# Patient Record
Sex: FEMALE | Race: WHITE | NOT HISPANIC OR LATINO | Employment: OTHER | URBAN - METROPOLITAN AREA
[De-identification: names, ages, dates, MRNs, and addresses within clinical notes are randomized per-mention and may not be internally consistent; named-entity substitution may affect disease eponyms.]

---

## 2024-05-14 DIAGNOSIS — Z79.01 CHRONIC ANTICOAGULATION: ICD-10-CM

## 2024-05-16 ENCOUNTER — OFFICE VISIT (OUTPATIENT)
Dept: MEDICAL GROUP | Facility: MEDICAL CENTER | Age: 84
End: 2024-05-16
Payer: COMMERCIAL

## 2024-05-16 VITALS
SYSTOLIC BLOOD PRESSURE: 118 MMHG | HEIGHT: 64 IN | BODY MASS INDEX: 40.97 KG/M2 | DIASTOLIC BLOOD PRESSURE: 50 MMHG | HEART RATE: 69 BPM | WEIGHT: 240 LBS | OXYGEN SATURATION: 94 % | TEMPERATURE: 98.8 F

## 2024-05-16 DIAGNOSIS — I48.0 PAROXYSMAL ATRIAL FIBRILLATION (HCC): ICD-10-CM

## 2024-05-16 DIAGNOSIS — J45.909 UNCOMPLICATED ASTHMA, UNSPECIFIED ASTHMA SEVERITY, UNSPECIFIED WHETHER PERSISTENT: ICD-10-CM

## 2024-05-16 DIAGNOSIS — E66.01 MORBID OBESITY WITH BMI OF 40.0-44.9, ADULT (HCC): ICD-10-CM

## 2024-05-16 DIAGNOSIS — G47.33 OSA ON CPAP: Chronic | ICD-10-CM

## 2024-05-16 DIAGNOSIS — R07.89 CHEST DISCOMFORT: ICD-10-CM

## 2024-05-16 DIAGNOSIS — M25.531 PAIN OF BOTH WRIST JOINTS: ICD-10-CM

## 2024-05-16 DIAGNOSIS — Z99.81 ON HOME OXYGEN THERAPY: ICD-10-CM

## 2024-05-16 DIAGNOSIS — E53.8 B12 DEFICIENCY: ICD-10-CM

## 2024-05-16 DIAGNOSIS — E66.2 OBESITY HYPOVENTILATION SYNDROME (HCC): ICD-10-CM

## 2024-05-16 DIAGNOSIS — Z95.0 CARDIAC PACEMAKER IN SITU: ICD-10-CM

## 2024-05-16 DIAGNOSIS — E11.8 TYPE 2 DIABETES MELLITUS WITH UNSPECIFIED COMPLICATIONS (HCC): ICD-10-CM

## 2024-05-16 DIAGNOSIS — R00.1 SYMPTOMATIC BRADYCARDIA: ICD-10-CM

## 2024-05-16 DIAGNOSIS — J96.11 CHRONIC RESPIRATORY FAILURE WITH HYPOXIA (HCC): Chronic | ICD-10-CM

## 2024-05-16 DIAGNOSIS — G47.30 SLEEP APNEA, UNSPECIFIED TYPE: ICD-10-CM

## 2024-05-16 DIAGNOSIS — Z95.2 S/P TAVR (TRANSCATHETER AORTIC VALVE REPLACEMENT): ICD-10-CM

## 2024-05-16 DIAGNOSIS — I10 ESSENTIAL (PRIMARY) HYPERTENSION: ICD-10-CM

## 2024-05-16 DIAGNOSIS — M25.532 PAIN OF BOTH WRIST JOINTS: ICD-10-CM

## 2024-05-16 DIAGNOSIS — Z95.0 PACEMAKER: ICD-10-CM

## 2024-05-16 DIAGNOSIS — R30.0 DYSURIA: ICD-10-CM

## 2024-05-16 DIAGNOSIS — R60.0 LOWER LEG EDEMA: ICD-10-CM

## 2024-05-16 DIAGNOSIS — D64.9 NORMOCYTIC ANEMIA: ICD-10-CM

## 2024-05-16 DIAGNOSIS — E03.4 HYPOTHYROIDISM DUE TO ACQUIRED ATROPHY OF THYROID: ICD-10-CM

## 2024-05-16 PROBLEM — I35.0 AORTIC STENOSIS: Status: RESOLVED | Noted: 2018-09-19 | Resolved: 2024-05-16

## 2024-05-16 PROBLEM — I35.0 CRITICAL AORTIC VALVE STENOSIS: Status: RESOLVED | Noted: 2018-09-20 | Resolved: 2024-05-16

## 2024-05-16 PROBLEM — I16.0 HYPERTENSIVE URGENCY: Status: RESOLVED | Noted: 2024-01-09 | Resolved: 2024-05-16

## 2024-05-16 PROBLEM — H91.93 DECREASED HEARING OF BOTH EARS: Status: RESOLVED | Noted: 2019-10-31 | Resolved: 2024-05-16
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
## 2024-05-16 PROBLEM — J32.9 FREQUENT SINUS INFECTIONS: Status: RESOLVED | Noted: 2020-07-31 | Resolved: 2024-05-16

## 2024-05-16 PROCEDURE — 3078F DIAST BP <80 MM HG: CPT | Performed by: STUDENT IN AN ORGANIZED HEALTH CARE EDUCATION/TRAINING PROGRAM

## 2024-05-16 PROCEDURE — 3074F SYST BP LT 130 MM HG: CPT | Performed by: STUDENT IN AN ORGANIZED HEALTH CARE EDUCATION/TRAINING PROGRAM

## 2024-05-16 PROCEDURE — 99214 OFFICE O/P EST MOD 30 MIN: CPT | Performed by: STUDENT IN AN ORGANIZED HEALTH CARE EDUCATION/TRAINING PROGRAM

## 2024-05-16 RX ORDER — EMPAGLIFLOZIN 25 MG/1
25 TABLET, FILM COATED ORAL DAILY
COMMUNITY
Start: 2024-03-26

## 2024-05-16 RX ORDER — HYDRALAZINE HYDROCHLORIDE 50 MG/1
50 TABLET, FILM COATED ORAL 3 TIMES DAILY
Qty: 300 TABLET | Refills: 3 | Status: SHIPPED | OUTPATIENT
Start: 2024-05-16

## 2024-05-16 RX ORDER — LEVOTHYROXINE SODIUM 0.12 MG/1
125 TABLET ORAL
Qty: 90 TABLET | Refills: 2 | Status: SHIPPED | OUTPATIENT
Start: 2024-05-16

## 2024-05-16 RX ORDER — BLOOD SUGAR DIAGNOSTIC
STRIP MISCELLANEOUS
COMMUNITY
Start: 2024-04-04

## 2024-05-16 RX ORDER — LISINOPRIL 40 MG/1
40 TABLET ORAL DAILY
Qty: 90 TABLET | Refills: 3 | Status: SHIPPED | OUTPATIENT
Start: 2024-05-16

## 2024-05-16 RX ORDER — FUROSEMIDE 20 MG/1
20 TABLET ORAL
Qty: 30 TABLET | Refills: 0 | Status: SHIPPED | OUTPATIENT
Start: 2024-05-16

## 2024-05-16 RX ORDER — WARFARIN SODIUM 7.5 MG/1
7.5 TABLET ORAL DAILY
Qty: 30 TABLET | Refills: 3 | Status: SHIPPED | OUTPATIENT
Start: 2024-05-16

## 2024-05-16 RX ORDER — LIOTHYRONINE SODIUM 5 UG/1
5 TABLET ORAL DAILY
COMMUNITY
Start: 2024-03-26

## 2024-05-16 ASSESSMENT — ENCOUNTER SYMPTOMS
FEVER: 0
PALPITATIONS: 0
VOMITING: 0
SHORTNESS OF BREATH: 0
CHILLS: 0
HEADACHES: 0
NAUSEA: 0
WHEEZING: 0
WEIGHT LOSS: 0
DIZZINESS: 0

## 2024-05-16 ASSESSMENT — FIBROSIS 4 INDEX: FIB4 SCORE: 1.94

## 2024-05-16 NOTE — PROGRESS NOTES
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   Discussed with patient she needs more frequent visit due to medical complexity   Last seen 1 year ago.   Urinary urgency/ frequency  Letter stating family has been acting as care giver  Referral to cardiology  Referral to pulmonology/ sleep  Medication refill  DME oxygen  arthralgia    #Urgency/ frequency  - 3-5 day  - mild dysuria while urinating, denies fever, but reports chills    # anxiety/ Panic  - she report she has been experiencing more symptoms of night time awakening as she has not been using her cpap since it is broken.   - she has recently seen at [facility] and given dose of medrol dose maria alejandra which may also contribute to her symptoms.     # episode of night time awakening  # chest discomfort- she reports waking up one night with right sided / sternal chest pain that improved with albuterol. She endorse she has been waking up at night due to cpap machines not working. She report this pain also is reproducible when palpating her sternum.     Health Maintenance:     ROS:  Review of Systems   Constitutional:  Negative for chills, fever and weight loss.   HENT:  Negative for hearing loss.    Respiratory:  Negative for shortness of breath and wheezing.    Cardiovascular:  Negative for chest pain and palpitations.   Gastrointestinal:   "Negative for nausea and vomiting.   Genitourinary:  Negative for frequency and urgency.   Skin:  Negative for rash.   Neurological:  Negative for dizziness and headaches.       Objective:     Exam:  /50 (BP Location: Left arm)   Pulse 69   Temp 37.1 °C (98.8 °F)   Ht 1.626 m (5' 4\")   Wt 109 kg (240 lb)   LMP 09/19/1981   SpO2 94% Comment: 4L O2  BMI 41.20 kg/m²  Body mass index is 41.2 kg/m².    Physical Exam  Constitutional:       Appearance: Normal appearance.   Pulmonary:      Comments: Speaking in complete sentences without signs of acute respiratory distress.   Musculoskeletal:      Cervical back: Normal range of motion and neck supple.      Comments: TTP sternum   Neurological:      General: No focal deficit present.      Mental Status: She is alert and oriented to person, place, and time. Mental status is at baseline.           Labs:     Assessment & Plan:     83 y.o. female with the following -   1. Symptomatic bradycardia  S/p a1cd  Following with cards    2. Hypothyroidism due to acquired atrophy of thyroid  Chronic stable  Last TSH in 1/2024 in normal range  - liothyronine (CYTOMEL) 5 MCG Tab; Take 5 mcg by mouth every day.  - levothyroxine (SYNTHROID) 125 MCG Tab; Take 1 Tablet by mouth every morning on an empty stomach. Brand Name  Dispense: 90 Tablet; Refill: 2  - CBC WITHOUT DIFFERENTIAL; Future  - Comp Metabolic Panel; Future    3. Lower leg edema  Chronic stable  - furosemide (LASIX) 20 MG Tab; Take 1 Tablet by mouth 1 time a day as needed (lower extremity edema).  Dispense: 30 Tablet; Refill: 0  - CBC WITHOUT DIFFERENTIAL; Future  - Comp Metabolic Panel; Future    4. Paroxysmal atrial fibrillation (HCC)  Chronic stable follows with AC  - warfarin (COUMADIN) 7.5 MG Tab; Take 1 Tablet by mouth every day at 6 PM.  Dispense: 30 Tablet; Refill: 3  - CBC WITHOUT DIFFERENTIAL; Future  - Comp Metabolic Panel; Future  - REFERRAL TO CARDIOLOGY    5. S/P TAVR (transcatheter aortic valve " replacement)  Chronic stable follows with A/C  - warfarin (COUMADIN) 7.5 MG Tab; Take 1 Tablet by mouth every day at 6 PM.  Dispense: 30 Tablet; Refill: 3  - REFERRAL TO CARDIOLOGY    6. Pacemaker  Request referral to Dr. Heart  - REFERRAL TO CARDIOLOGY    7. Cardiac pacemaker in situ  Followign with EP  - warfarin (COUMADIN) 7.5 MG Tab; Take 1 Tablet by mouth every day at 6 PM.  Dispense: 30 Tablet; Refill: 3  - REFERRAL TO CARDIOLOGY    8. On home oxygen therapy    - DME O2 New Set Up    9. TYLER on CPAP    - DME O2 New Set Up    10. Chronic respiratory failure with hypoxia (HCC)    - DME O2 New Set Up  - Referral to Pulmonary and Sleep Medicine    11. Sleep apnea, unspecified type    - Referral to Pulmonary and Sleep Medicine    12. Dysuria  Acute stable x 3 week  In setting of jardiance  Will obtain lab UA  Recommend patient schedule visit to discuss further regarding possible incontinence issue  - URINALYSIS,CULTURE IF INDICATED; Future    13. Normocytic anemia  Noted on last lab  - IRON/TOTAL IRON BIND; Future  - VITAMIN B12; Future    14. B12 deficiency  Report prior history of     15. Morbid obesity with BMI of 40.0-44.9, adult (HCC)  Chronic stable  - DME O2 New Set Up  - Patient identified as having weight management issue.  Appropriate orders and counseling given.    16. Essential (primary) hypertension  Chronic stable   Medication refilled  Bp well controlled   - hydrALAZINE (APRESOLINE) 50 MG Tab; Take 1 Tablet by mouth in the morning, at noon, and at bedtime.  Dispense: 300 Tablet; Refill: 3  - lisinopril (PRINIVIL) 40 MG tablet; Take 1 Tablet by mouth every day.  Dispense: 90 Tablet; Refill: 3    17. Type 2 diabetes mellitus with unspecified complications (HCC)  Chronic stable  Medication refilled   - JARDIANCE 25 MG Tab; Take 25 mg by mouth every day.  - ACCU-CHEK GUIDE strip; USE ONCE DAILY TO TEST BLOOD SUGAR AND AS NEEDED FOR HIGH OR LOW SUGAR    18. Pain of both wrist joints  Chronic  stable  Recommend acetaminophen and voltaren gel  Ibuprofen CI in setting of warfarin     19 asthma  20obesity hypoventilation syndrome  - dme o2  - refer to pulm/ slee    21 chest discomfort  1 episode that occurred during the night, no current chest pain. Reports symptoms resolved with use of albuterol. She reports similar pain is presented with palpation of her sternum/ sternum head. Discussed with patient based on on description I do not think it is ACS however, she is at high risk for ACS given age and multiple comorbidities and she should head to ED if needed.   Return in about 3 months (around 8/16/2024) for Lab review, Med check.    Please note that this dictation was created using voice recognition software. I have made every reasonable attempt to correct obvious errors, but I expect that there are errors of grammar and possibly content that I did not discover before finalizing the note.

## 2024-05-16 NOTE — LETTER
[facility] [address] [facility] [address]     May 16, 2024    Patient: Noemi Moran   YOB: 1940   Date of Visit: 5/16/2024       To Whom It May Concern:    Noemi Moran was seen and treated in our department on 5/16/2024. She has significant medical comorbidity and Bimal Moran and Domenica Moran has been acting as her primary care giver 1/2024-5/16/2024.     Sincerely,     Felipe Masterson M.D.

## 2024-06-03 PROBLEM — M18.12 PRIMARY OSTEOARTHRITIS OF FIRST CARPOMETACARPAL JOINT OF LEFT HAND: Status: ACTIVE | Noted: 2024-06-03

## 2024-06-03 PROBLEM — M18.11 PRIMARY OSTEOARTHRITIS OF FIRST CARPOMETACARPAL JOINT OF RIGHT HAND: Status: ACTIVE | Noted: 2024-06-03

## 2024-06-03 PROBLEM — M25.539 PAIN OF ULNAR SIDE OF WRIST: Status: ACTIVE | Noted: 2024-06-03

## 2024-06-04 DIAGNOSIS — Z79.01 CHRONIC ANTICOAGULATION: ICD-10-CM

## 2024-06-05 ENCOUNTER — HOSPITAL ENCOUNTER (OUTPATIENT)
Dept: LAB | Facility: MEDICAL CENTER | Age: 84
End: 2024-06-05
Attending: STUDENT IN AN ORGANIZED HEALTH CARE EDUCATION/TRAINING PROGRAM
Payer: MEDICARE

## 2024-06-05 ENCOUNTER — TELEPHONE (OUTPATIENT)
Dept: MEDICAL GROUP | Facility: MEDICAL CENTER | Age: 84
End: 2024-06-05
Payer: MEDICARE

## 2024-06-05 DIAGNOSIS — E03.4 HYPOTHYROIDISM DUE TO ACQUIRED ATROPHY OF THYROID: ICD-10-CM

## 2024-06-05 DIAGNOSIS — R30.0 DYSURIA: ICD-10-CM

## 2024-06-05 DIAGNOSIS — I48.0 PAROXYSMAL ATRIAL FIBRILLATION (HCC): ICD-10-CM

## 2024-06-05 DIAGNOSIS — R60.0 LOWER LEG EDEMA: ICD-10-CM

## 2024-06-05 DIAGNOSIS — D64.9 NORMOCYTIC ANEMIA: ICD-10-CM

## 2024-06-05 LAB
ALBUMIN SERPL BCP-MCNC: 3.9 G/DL (ref 3.2–4.9)
ALBUMIN/GLOB SERPL: 1.4 G/DL
ALP SERPL-CCNC: 58 U/L (ref 30–99)
ALT SERPL-CCNC: 12 U/L (ref 2–50)
ANION GAP SERPL CALC-SCNC: 11 MMOL/L (ref 7–16)
AST SERPL-CCNC: 14 U/L (ref 12–45)
BILIRUB SERPL-MCNC: 0.2 MG/DL (ref 0.1–1.5)
BUN SERPL-MCNC: 19 MG/DL (ref 8–22)
CALCIUM ALBUM COR SERPL-MCNC: 9 MG/DL (ref 8.5–10.5)
CALCIUM SERPL-MCNC: 8.9 MG/DL (ref 8.5–10.5)
CHLORIDE SERPL-SCNC: 106 MMOL/L (ref 96–112)
CO2 SERPL-SCNC: 24 MMOL/L (ref 20–33)
CREAT SERPL-MCNC: 0.7 MG/DL (ref 0.5–1.4)
ERYTHROCYTE [DISTWIDTH] IN BLOOD BY AUTOMATED COUNT: 41.5 FL (ref 35.9–50)
GFR SERPLBLD CREATININE-BSD FMLA CKD-EPI: 85 ML/MIN/1.73 M 2
GLOBULIN SER CALC-MCNC: 2.7 G/DL (ref 1.9–3.5)
GLUCOSE SERPL-MCNC: 110 MG/DL (ref 65–99)
HCT VFR BLD AUTO: 36.5 % (ref 37–47)
HGB BLD-MCNC: 11.6 G/DL (ref 12–16)
IRON SATN MFR SERPL: 15 % (ref 15–55)
IRON SERPL-MCNC: 39 UG/DL (ref 40–170)
MCH RBC QN AUTO: 27.9 PG (ref 27–33)
MCHC RBC AUTO-ENTMCNC: 31.8 G/DL (ref 32.2–35.5)
MCV RBC AUTO: 87.7 FL (ref 81.4–97.8)
PLATELET # BLD AUTO: 194 K/UL (ref 164–446)
PMV BLD AUTO: 13 FL (ref 9–12.9)
POTASSIUM SERPL-SCNC: 4.1 MMOL/L (ref 3.6–5.5)
PROT SERPL-MCNC: 6.6 G/DL (ref 6–8.2)
RBC # BLD AUTO: 4.16 M/UL (ref 4.2–5.4)
SODIUM SERPL-SCNC: 141 MMOL/L (ref 135–145)
TIBC SERPL-MCNC: 268 UG/DL (ref 250–450)
UIBC SERPL-MCNC: 229 UG/DL (ref 110–370)
WBC # BLD AUTO: 5.9 K/UL (ref 4.8–10.8)

## 2024-06-05 PROCEDURE — 82607 VITAMIN B-12: CPT

## 2024-06-05 PROCEDURE — 80053 COMPREHEN METABOLIC PANEL: CPT

## 2024-06-05 PROCEDURE — 83550 IRON BINDING TEST: CPT

## 2024-06-05 PROCEDURE — 36415 COLL VENOUS BLD VENIPUNCTURE: CPT

## 2024-06-05 PROCEDURE — 83540 ASSAY OF IRON: CPT

## 2024-06-05 PROCEDURE — 85027 COMPLETE CBC AUTOMATED: CPT

## 2024-06-06 DIAGNOSIS — D50.9 IRON DEFICIENCY ANEMIA, UNSPECIFIED IRON DEFICIENCY ANEMIA TYPE: ICD-10-CM

## 2024-06-06 LAB — VIT B12 SERPL-MCNC: 1404 PG/ML (ref 211–911)

## 2024-06-06 RX ORDER — FERROUS SULFATE 325(65) MG
325 TABLET ORAL
Qty: 36 TABLET | Refills: 1 | Status: SHIPPED | OUTPATIENT
Start: 2024-06-07

## 2024-07-01 DIAGNOSIS — R60.0 LOWER LEG EDEMA: ICD-10-CM

## 2024-07-03 RX ORDER — FUROSEMIDE 20 MG/1
20 TABLET ORAL
Qty: 30 TABLET | Refills: 0 | Status: SHIPPED | OUTPATIENT
Start: 2024-07-03

## 2024-07-11 ENCOUNTER — NON-PROVIDER VISIT (OUTPATIENT)
Dept: CARDIOLOGY | Facility: MEDICAL CENTER | Age: 84
End: 2024-07-11
Payer: MEDICARE

## 2024-07-11 PROCEDURE — 93294 REM INTERROG EVL PM/LDLS PM: CPT | Performed by: INTERNAL MEDICINE

## 2024-08-04 DIAGNOSIS — R60.0 LOWER LEG EDEMA: ICD-10-CM

## 2024-08-05 RX ORDER — FUROSEMIDE 20 MG/1
TABLET ORAL
Qty: 90 TABLET | Refills: 0 | Status: SHIPPED | OUTPATIENT
Start: 2024-08-05

## 2024-08-13 ENCOUNTER — TELEPHONE (OUTPATIENT)
Dept: CARDIOLOGY | Facility: MEDICAL CENTER | Age: 84
End: 2024-08-13
Payer: MEDICARE

## 2024-08-15 ENCOUNTER — OFFICE VISIT (OUTPATIENT)
Dept: MEDICAL GROUP | Facility: MEDICAL CENTER | Age: 84
End: 2024-08-15
Payer: MEDICARE

## 2024-08-15 VITALS
HEIGHT: 64 IN | OXYGEN SATURATION: 95 % | SYSTOLIC BLOOD PRESSURE: 140 MMHG | WEIGHT: 237.88 LBS | DIASTOLIC BLOOD PRESSURE: 76 MMHG | BODY MASS INDEX: 40.61 KG/M2 | HEART RATE: 85 BPM | TEMPERATURE: 97.9 F

## 2024-08-15 DIAGNOSIS — E11.8 TYPE 2 DIABETES MELLITUS WITH UNSPECIFIED COMPLICATIONS (HCC): ICD-10-CM

## 2024-08-15 DIAGNOSIS — D50.9 IRON DEFICIENCY ANEMIA, UNSPECIFIED IRON DEFICIENCY ANEMIA TYPE: ICD-10-CM

## 2024-08-15 DIAGNOSIS — R39.15 URINARY URGENCY: ICD-10-CM

## 2024-08-15 PROBLEM — E66.01 MORBID OBESITY WITH BMI OF 40.0-44.9, ADULT (HCC): Status: RESOLVED | Noted: 2024-05-16 | Resolved: 2024-08-15

## 2024-08-15 PROCEDURE — 3078F DIAST BP <80 MM HG: CPT | Performed by: STUDENT IN AN ORGANIZED HEALTH CARE EDUCATION/TRAINING PROGRAM

## 2024-08-15 PROCEDURE — 3077F SYST BP >= 140 MM HG: CPT | Performed by: STUDENT IN AN ORGANIZED HEALTH CARE EDUCATION/TRAINING PROGRAM

## 2024-08-15 PROCEDURE — 99214 OFFICE O/P EST MOD 30 MIN: CPT | Performed by: STUDENT IN AN ORGANIZED HEALTH CARE EDUCATION/TRAINING PROGRAM

## 2024-08-15 RX ORDER — FERROUS SULFATE 325(65) MG
325 TABLET ORAL
Qty: 36 TABLET | Refills: 1 | Status: SHIPPED | OUTPATIENT
Start: 2024-08-16

## 2024-08-15 ASSESSMENT — ENCOUNTER SYMPTOMS
NAUSEA: 0
PALPITATIONS: 0
FEVER: 0
HEADACHES: 0
DIZZINESS: 0
WEIGHT LOSS: 0
WHEEZING: 0
SHORTNESS OF BREATH: 0
CHILLS: 0
VOMITING: 0

## 2024-08-15 ASSESSMENT — FIBROSIS 4 INDEX: FIB4 SCORE: 1.73

## 2024-08-15 NOTE — PROGRESS NOTES
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    Chronic anemia-CBC from 7 months ago showed anemia CBC from 2 months ago does show persistent anemia with slightly elevated MCHC. Iron panel showed iron saturation of 15 we will start patient on iron supplement.  Patient declined stool workup/colonoscopy referral at this time we did discuss possibility about bleeding in the colon/colon cancer.     Health Maintenance:     ROS:  Review of Systems   Constitutional:  Negative for chills, fever and weight loss.   HENT:  Negative for hearing loss.    Respiratory:  Negative for shortness of breath and wheezing.    Cardiovascular:  Negative for chest pain and palpitations.   Gastrointestinal:  Negative for nausea and vomiting.   Genitourinary:  Negative for frequency and urgency.   Skin:  Negative for rash.   Neurological:  Negative for dizziness and headaches.       Objective:     Exam:  BP (!) 140/76 (BP Location: Left arm)   Pulse 85   Temp 36.6 °C (97.9 °F)   Ht 1.626 m (5' 4\")   Wt 108 kg (237 lb 14 oz)   LMP 09/19/1981   SpO2 95% Comment: 3-4 L o2  BMI 40.83 kg/m²  Body mass " index is 40.83 kg/m².    Physical Exam  Constitutional:       Appearance: Normal appearance.   Cardiovascular:      Heart sounds: No murmur heard.  Pulmonary:      Effort: Pulmonary effort is normal.      Breath sounds: Normal breath sounds. No wheezing.   Musculoskeletal:      Cervical back: Normal range of motion and neck supple.   Neurological:      Mental Status: She is alert.           Labs:     Assessment & Plan:     83 y.o. female with the following -     1. Iron deficiency anemia, unspecified iron deficiency anemia type  Chronic, stable  Anemia with iron saturation of 15 patient's not interested in colonoscopy understanding the risks of colon cancer.  She had a procedure in January.  At this time we will repeat lab and replace iron as blood work does not improve may consider referral for colonoscopy however likely will need cardiology rule out  - ferrous sulfate 325 (65 Fe) MG tablet; Take 1 Tablet by mouth every Monday, Wednesday, and Friday.  Dispense: 36 Tablet; Refill: 1  - CBC WITHOUT DIFFERENTIAL; Future  - IRON/TOTAL IRON BIND; Future  - FERRITIN; Future  - Comp Metabolic Panel; Future    2. Type 2 diabetes mellitus with unspecified complications (HCC)  Chronic, stable  Patient is following with Dr. Whitaker however report has not followed with Dr. Pelletier for a while she is on metformin 1 g twice a day and Jardiance 25 mg was sent during last visit he reports he is taking both medications tolerating well  - CBC WITHOUT DIFFERENTIAL; Future  - IRON/TOTAL IRON BIND; Future  - FERRITIN; Future  - Comp Metabolic Panel; Future  - HEMOGLOBIN A1C; Future    3. Urinary urgency  This is a chronic and stable condition patient report history of urinary urgency heat she will schedule visit to discuss this        Return in about 3 months (around 11/15/2024) for Lab review, Med check.    Please note that this dictation was created using voice recognition software. I have made every reasonable attempt to  correct obvious errors, but I expect that there are errors of grammar and possibly content that I did not discover before finalizing the note.

## 2024-08-15 NOTE — LETTER
[place] [facility] [address]  [address] [facility] [address]     August 15, 2024    Patient: Noemi Moran   YOB: 1940   Date of Visit: 8/15/2024       To Whom It May Concern:    Noemi Moran was seen and treated in our department on 8/15/2024. Patient is oxygen dependent and uses a oxygen concentrator, so she would require uninterrupted electricity if possible.      Sincerely,     Felipe Masterson M.D.

## 2024-08-15 NOTE — LETTER
[facility] [address]  [address] [facility] [facility] [address]     August 15, 2024    Patient: Noemi Moran   YOB: 1940   Date of Visit: 8/15/2024       To Whom It May Concern:    Noemi Moran was seen and treated in our department on 8/15/2024. She has multiple diseases and she is unable to push her garbage to the street. Please accommodate if possible.       Sincerely,     Felipe Masterson M.D.

## 2024-08-21 ENCOUNTER — HOSPITAL ENCOUNTER (OUTPATIENT)
Dept: LAB | Facility: MEDICAL CENTER | Age: 84
End: 2024-08-21
Attending: STUDENT IN AN ORGANIZED HEALTH CARE EDUCATION/TRAINING PROGRAM
Payer: MEDICARE

## 2024-08-21 DIAGNOSIS — E11.8 TYPE 2 DIABETES MELLITUS WITH UNSPECIFIED COMPLICATIONS (HCC): ICD-10-CM

## 2024-08-21 DIAGNOSIS — D50.9 IRON DEFICIENCY ANEMIA, UNSPECIFIED IRON DEFICIENCY ANEMIA TYPE: ICD-10-CM

## 2024-08-21 LAB
ALBUMIN SERPL BCP-MCNC: 4.1 G/DL (ref 3.2–4.9)
ALBUMIN/GLOB SERPL: 1.5 G/DL
ALP SERPL-CCNC: 61 U/L (ref 30–99)
ALT SERPL-CCNC: 14 U/L (ref 2–50)
ANION GAP SERPL CALC-SCNC: 11 MMOL/L (ref 7–16)
AST SERPL-CCNC: 15 U/L (ref 12–45)
BILIRUB SERPL-MCNC: 0.5 MG/DL (ref 0.1–1.5)
BUN SERPL-MCNC: 26 MG/DL (ref 8–22)
CALCIUM ALBUM COR SERPL-MCNC: 9.2 MG/DL (ref 8.5–10.5)
CALCIUM SERPL-MCNC: 9.3 MG/DL (ref 8.5–10.5)
CHLORIDE SERPL-SCNC: 105 MMOL/L (ref 96–112)
CO2 SERPL-SCNC: 26 MMOL/L (ref 20–33)
CREAT SERPL-MCNC: 0.94 MG/DL (ref 0.5–1.4)
ERYTHROCYTE [DISTWIDTH] IN BLOOD BY AUTOMATED COUNT: 44.5 FL (ref 35.9–50)
EST. AVERAGE GLUCOSE BLD GHB EST-MCNC: 128 MG/DL
FERRITIN SERPL-MCNC: 96.5 NG/ML (ref 10–291)
GFR SERPLBLD CREATININE-BSD FMLA CKD-EPI: 60 ML/MIN/1.73 M 2
GLOBULIN SER CALC-MCNC: 2.8 G/DL (ref 1.9–3.5)
GLUCOSE SERPL-MCNC: 109 MG/DL (ref 65–99)
HBA1C MFR BLD: 6.1 % (ref 4–5.6)
HCT VFR BLD AUTO: 38.9 % (ref 37–47)
HGB BLD-MCNC: 12.5 G/DL (ref 12–16)
IRON SATN MFR SERPL: 39 % (ref 15–55)
IRON SERPL-MCNC: 100 UG/DL (ref 40–170)
MCH RBC QN AUTO: 28.6 PG (ref 27–33)
MCHC RBC AUTO-ENTMCNC: 32.1 G/DL (ref 32.2–35.5)
MCV RBC AUTO: 89 FL (ref 81.4–97.8)
PLATELET # BLD AUTO: 182 K/UL (ref 164–446)
PMV BLD AUTO: 13.3 FL (ref 9–12.9)
POTASSIUM SERPL-SCNC: 4.6 MMOL/L (ref 3.6–5.5)
PROT SERPL-MCNC: 6.9 G/DL (ref 6–8.2)
RBC # BLD AUTO: 4.37 M/UL (ref 4.2–5.4)
SODIUM SERPL-SCNC: 142 MMOL/L (ref 135–145)
TIBC SERPL-MCNC: 258 UG/DL (ref 250–450)
UIBC SERPL-MCNC: 158 UG/DL (ref 110–370)
WBC # BLD AUTO: 6.1 K/UL (ref 4.8–10.8)

## 2024-08-21 PROCEDURE — 36415 COLL VENOUS BLD VENIPUNCTURE: CPT | Mod: GA

## 2024-08-21 PROCEDURE — 83550 IRON BINDING TEST: CPT

## 2024-08-21 PROCEDURE — 85027 COMPLETE CBC AUTOMATED: CPT

## 2024-08-21 PROCEDURE — 80053 COMPREHEN METABOLIC PANEL: CPT

## 2024-08-21 PROCEDURE — 83540 ASSAY OF IRON: CPT

## 2024-08-21 PROCEDURE — 83036 HEMOGLOBIN GLYCOSYLATED A1C: CPT | Mod: GA

## 2024-08-21 PROCEDURE — 82728 ASSAY OF FERRITIN: CPT
